# Patient Record
Sex: FEMALE | URBAN - NONMETROPOLITAN AREA
[De-identification: names, ages, dates, MRNs, and addresses within clinical notes are randomized per-mention and may not be internally consistent; named-entity substitution may affect disease eponyms.]

---

## 2022-09-29 ENCOUNTER — TELEPHONE (OUTPATIENT)
Dept: FAMILY MEDICINE CLINIC | Age: 32
End: 2022-09-29

## 2022-09-29 NOTE — TELEPHONE ENCOUNTER
Pt no showed for appt. I called her caregiver, to see if they needed to reschedule her appt. The pt's caregiver sounded under the weather, & declined reschedule appt.